# Patient Record
(demographics unavailable — no encounter records)

---

## 2024-11-26 NOTE — HISTORY OF PRESENT ILLNESS
[de-identified] : TAWNYA KING  is a 80 year woman with a history of clogged left ear. She has done  lots of flying in the past few months.

## 2025-01-08 NOTE — ASSESSMENT
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  I discussed treatment options including shoe changes, orthotics, injections and surgery.  I clearly would not recommend a fusion given her symptoms.  Symptoms present for 1 year and stopping her activities she is opting for an injection.  I ordered an ultrasound-guided cortisone injection into the second and third TMT joints.  She will keep me updated on her symptoms and response to the injection.  All questions were answered.

## 2025-01-08 NOTE — HISTORY OF PRESENT ILLNESS
[FreeTextEntry1] : The patient is a 80-year-old female with DrValerie Who presents for chief complaint of right foot pain.  Is been present for about 1 year since she started taking dancing classes.  There is no injury.  Her symptoms seem to exacerbate on New Year's Esther when she was in [place] wearing high heels for a few hours.  While the symptoms have worsened that been present for the past 1 year.  They are centered around the midfoot area.

## 2025-01-08 NOTE — PHYSICAL EXAM
[de-identified] : Right foot examination today demonstrates mild tenderness in the second and third TMT joint.  She has no signs of an effusion of lesser MTP joints.  The second and third metatarsals are mildly tender as well.  She has good strength. [de-identified] : Right foot weightbearing radiographs (AP/oblique/lateral) were obtained today Indication-pain Radiographs demonstrate midfoot arthritis single along the second and third TMT joint.  No evidence of a stress fracture. These radiographs were reviewed and discussed with the patient.

## 2025-04-01 NOTE — PHYSICAL EXAM
[TextEntry] : PHYSICAL EXAM  General: The patient was alert and oriented and in no distress. Voice was normal.    Face: The patient had no facial asymmetry or mass. The skin was unremarkable.  Ears: External ears were normal without deformity. Ear canals were normal. Tympanic membranes were intact and normal. No perforation or effusion I removed impacted cerumen from the right ear under the microscope with  forceps    Nose:  The external nose had no significant deformity.  There was no facial tenderness.  On anterior rhinoscopy, the nasal mucosa was normal.  The anterior septum was normal. There were no visualized polyps purulence  or masses.  Oral cavity: The oral mucosa was normal. The oral and base of tongue were clear and without mass. The gingival and buccal mucosa were moist and without lesions. The palate moved well. There was no cleft palate. There appeared to be good salivary flow.   There was no pus, erythema or mass in the oral cavity/oropharynx.  Neck:  The neck was symmetrical. The parotid and submandibular glands were normal without masses. The trachea was midline and there was no unusual crepitus. Thyroid was smooth and nontender and no masses were palpated. Cervical adenopathy- none.

## 2025-05-06 NOTE — ASSESSMENT
[FreeTextEntry1] : CP- CRFs include mild HTN, HLD and RT to right breast.   She agrees to a stress echo.   HLD- , not treated.  Will obtain a CCS to assess for subclinical dz.

## 2025-05-06 NOTE — HISTORY OF PRESENT ILLNESS
[FreeTextEntry1] : 79 y/o F with chronic back pain, mild HTN and HLD.  Not sleeping well, takes Ambien.  When under stress she gets episodic CP.   Onset: more so in the last mo Location: midline Duration: 1 min Frequency: daily to less than daily Character:  Discomfort Associated Symptoms:  Rapid pulse assoc with her CP. Severity: mild Modifying Factors: when under stress.  No Sxs with  but no aerobics.   , ,, TG 73, ,  CHEM 20 nl  Dx with breast CA 3 y/a.  Lumpectomy, and RT to right only.   EKG 3/27/25  WNL